# Patient Record
Sex: MALE | Race: WHITE | Employment: OTHER | URBAN - METROPOLITAN AREA
[De-identification: names, ages, dates, MRNs, and addresses within clinical notes are randomized per-mention and may not be internally consistent; named-entity substitution may affect disease eponyms.]

---

## 2017-06-08 PROBLEM — N18.2 CHRONIC KIDNEY DISEASE, STAGE II (MILD): Status: ACTIVE | Noted: 2017-06-08

## 2017-06-08 PROBLEM — H26.9 CATARACTS, BOTH EYES: Status: ACTIVE | Noted: 2017-06-08

## 2017-11-10 ENCOUNTER — TELEPHONE (OUTPATIENT)
Dept: FAMILY MEDICINE CLINIC | Age: 67
End: 2017-11-10

## 2017-11-10 NOTE — TELEPHONE ENCOUNTER
Patient has an appointment on 12/15, would like to have his lab work completed before then. Would like to  order on 11/14 when wife comes in for her appointment.   He will be going to [employer].

## 2017-12-17 PROBLEM — F17.200 TOBACCO DEPENDENCE: Status: ACTIVE | Noted: 2017-12-17

## 2017-12-17 PROBLEM — E11.9 DIABETES MELLITUS TYPE 2 IN NONOBESE (HCC): Status: ACTIVE | Noted: 2017-12-17

## 2018-03-28 ENCOUNTER — TELEPHONE (OUTPATIENT)
Dept: FAMILY MEDICINE CLINIC | Age: 68
End: 2018-03-28

## 2018-03-29 DIAGNOSIS — I10 ESSENTIAL HYPERTENSION: Primary | ICD-10-CM

## 2018-03-29 DIAGNOSIS — Z12.5 ENCOUNTER FOR PROSTATE CANCER SCREENING: ICD-10-CM

## 2018-03-29 DIAGNOSIS — E78.5 HYPERLIPIDEMIA, UNSPECIFIED HYPERLIPIDEMIA TYPE: ICD-10-CM

## 2018-03-29 DIAGNOSIS — Z12.5 PROSTATE CANCER SCREENING: ICD-10-CM

## 2018-03-29 DIAGNOSIS — E11.9 DIABETES MELLITUS TYPE 2 IN NONOBESE (HCC): ICD-10-CM

## 2018-05-31 ENCOUNTER — TELEPHONE (OUTPATIENT)
Dept: FAMILY MEDICINE CLINIC | Age: 68
End: 2018-05-31

## 2018-07-25 RX ORDER — GLIPIZIDE 10 MG/1
TABLET ORAL
Qty: 360 TABLET | Refills: 0 | Status: SHIPPED | OUTPATIENT
Start: 2018-07-25 | End: 2018-10-24 | Stop reason: SDUPTHER

## 2018-07-30 ENCOUNTER — OFFICE VISIT (OUTPATIENT)
Dept: FAMILY MEDICINE CLINIC | Age: 68
End: 2018-07-30

## 2018-07-30 VITALS
HEIGHT: 69 IN | WEIGHT: 211 LBS | SYSTOLIC BLOOD PRESSURE: 134 MMHG | HEART RATE: 76 BPM | DIASTOLIC BLOOD PRESSURE: 64 MMHG | BODY MASS INDEX: 31.25 KG/M2 | OXYGEN SATURATION: 92 %

## 2018-07-30 DIAGNOSIS — Z11.59 ENCOUNTER FOR HEPATITIS C SCREENING TEST FOR LOW RISK PATIENT: ICD-10-CM

## 2018-07-30 DIAGNOSIS — I10 ESSENTIAL HYPERTENSION: ICD-10-CM

## 2018-07-30 DIAGNOSIS — F17.200 TOBACCO DEPENDENCE: ICD-10-CM

## 2018-07-30 DIAGNOSIS — E78.5 HYPERLIPIDEMIA, UNSPECIFIED HYPERLIPIDEMIA TYPE: ICD-10-CM

## 2018-07-30 DIAGNOSIS — E11.9 DIABETES MELLITUS TYPE 2 IN NONOBESE (HCC): Primary | ICD-10-CM

## 2018-07-30 DIAGNOSIS — Z23 NEED FOR PROPHYLACTIC VACCINATION WITH STREPTOCOCCUS PNEUMONIAE (PNEUMOCOCCUS) AND INFLUENZA VACCINES: ICD-10-CM

## 2018-07-30 DIAGNOSIS — N18.2 CHRONIC KIDNEY DISEASE, STAGE II (MILD): ICD-10-CM

## 2018-07-30 DIAGNOSIS — J44.9 CHRONIC OBSTRUCTIVE PULMONARY DISEASE, UNSPECIFIED COPD TYPE (HCC): ICD-10-CM

## 2018-07-30 DIAGNOSIS — N18.30 STAGE 3 CHRONIC KIDNEY DISEASE (HCC): ICD-10-CM

## 2018-07-30 PROCEDURE — 99214 OFFICE O/P EST MOD 30 MIN: CPT | Performed by: FAMILY MEDICINE

## 2018-07-30 PROCEDURE — 90670 PCV13 VACCINE IM: CPT | Performed by: FAMILY MEDICINE

## 2018-07-30 PROCEDURE — G0009 ADMIN PNEUMOCOCCAL VACCINE: HCPCS | Performed by: FAMILY MEDICINE

## 2018-07-30 RX ORDER — ALBUTEROL SULFATE 90 UG/1
2 AEROSOL, METERED RESPIRATORY (INHALATION) EVERY 6 HOURS PRN
Qty: 1 INHALER | Refills: 3 | Status: SHIPPED | OUTPATIENT
Start: 2018-07-30 | End: 2019-05-31 | Stop reason: SDUPTHER

## 2018-07-30 ASSESSMENT — PATIENT HEALTH QUESTIONNAIRE - PHQ9
1. LITTLE INTEREST OR PLEASURE IN DOING THINGS: 1
SUM OF ALL RESPONSES TO PHQ QUESTIONS 1-9: 1
SUM OF ALL RESPONSES TO PHQ9 QUESTIONS 1 & 2: 1
2. FEELING DOWN, DEPRESSED OR HOPELESS: 0

## 2018-07-30 NOTE — PROGRESS NOTES
Establish care with new physician in office      Shea Gottron  YOB: 1950    Date of Service:  7/30/2018    Chief Complaint:   Shea Gottron is a 75 y.o. male who presents for complete physical examination. HPI:     Patient presents for routine follow-up. He had previously followed with Dr. Jessica Villafuerte who has retired. Patient's past medical history of type 2 diabetes mellitus. Mid day blood sugars have been running . Not sure fbs. Watches diet fairly carefully. Occasional cookie (3 per week). Patient is a history of hx pertension. He is compliant with hydrochlorothiazide combination. Patient denies headache, chest pain, and palpitations. Patient with a history of hyperlipidemia. He has been compliant with Zocor. He has not experiencing myalgias. Patient with a history of chronic kidney disease. Recent blood work 7/21/2018 shows stable function. Smokes 1 ppd x 54 years. Uses albuterol inhaler about 4 times a year. Denies cough and shortness of breath otherwise. Stopped drinking diet pop after kidney stones. Now drinks water or lemonade. Last blood work performed 7/21/2018 and reviewed with patient today. LDL was 59 with an HDL of 40. Triglycerides 70. BMP with creatinine of 1.47 in when necessary of 33. GFR 48. This is about his baseline. LFTs normal. A urine microalbumin negative. TSH normal 1.81. PSA normal 1.8 (stable). Hemoglobin A1c was 7.0 which was stable over the preceding 8 months.     Wt Readings from Last 3 Encounters:   07/30/18 211 lb (95.7 kg)   12/15/17 215 lb (97.5 kg)   06/08/17 216 lb (98 kg)     BP Readings from Last 3 Encounters:   07/30/18 134/64   12/15/17 (!) 140/90   06/08/17 136/74       Patient Active Problem List   Diagnosis    Hypertension    Hyperlipemia    Personal history of renal calculi    Chronic kidney disease, stage II (mild)    Cataracts, both eyes    Diabetes mellitus type 2 in nonobese ([address]    Tobacco dependence Preventive Care:  Health Maintenance   Topic Date Due    Hepatitis C screen  1950    Diabetic foot exam  08/01/1959    DTaP/Tdap/Td vaccine (1 - Tdap) 08/01/1968    Shingles Vaccine (1 of 2 - 2 Dose Series) 08/01/1999    Low dose CT lung screening  08/01/2004    Pneumococcal low/med risk (1 of 2 - PCV13) 08/01/2014    Flu vaccine (1) 09/01/2018    Diabetic retinal exam  06/01/2019    A1C test (Diabetic or Prediabetic)  07/21/2019    Lipid screen  07/21/2019    Potassium monitoring  07/21/2019    Creatinine monitoring  07/21/2019    Colon cancer screen colonoscopy  08/20/2025    AAA screen  Completed        Lipid panel:    Lab Results   Component Value Date    CHOL 114 07/21/2018    CHOL 74 07/21/2018    TRIG 70 07/21/2018    HDL 40 (L) 07/21/2018    LDLCALC 59 07/21/2018         There is no immunization history on file for this patient. No Known Allergies  Outpatient Prescriptions Marked as Taking for the 7/30/18 encounter (Office Visit) with Renaldo Urbina MD   Medication Sig Dispense Refill    glipiZIDE (GLUCOTROL) 10 MG tablet TAKE TWO TABLETS BY MOUTH TWICE A DAY BEFORE MEALS 360 tablet 0    LEVEMIR FLEXTOUCH 100 UNIT/ML injection pen INJECT 15 UNITS UNDER THE SKIN ONCE NIGHTLY 5 pen 2    lisinopril-hydrochlorothiazide (PRINZIDE;ZESTORETIC) 20-25 MG per tablet TAKE ONE TABLET BY MOUTH DAILY 90 tablet 1    metFORMIN (GLUCOPHAGE) 1000 MG tablet TAKE ONE TABLET BY MOUTH TWICE A DAY WITH MEALS 60 tablet 4    simvastatin (ZOCOR) 40 MG tablet TAKE ONE TABLET BY MOUTH ONCE NIGHTLY 90 tablet 1    metoprolol tartrate (LOPRESSOR) 25 MG tablet TAKE ONE TABLET BY MOUTH TWICE A DAY 60 tablet 3    glucose blood VI test strips (ASCENSIA AUTODISC VI;ONE TOUCH ULTRA TEST VI) strip 1 each by In Vitro route daily As needed. 100 each 3    glucose blood VI test strips (ACCU-CHEK LACEY PLUS) strip 1 each by In Vitro route daily As needed.  100 each 3       Past Medical History: PREVNAR 13 IM (Pneumococcal conjugate vaccine 13-valent)  -     albuterol sulfate HFA (PROAIR HFA) 108 (90 Base) MCG/ACT inhaler; Inhale 2 puffs into the lungs every 6 hours as needed for Wheezing  -     zoster recombinant adjuvanted vaccine (SHINGRIX) 50 MCG SUSR injection; 0.5mL IM x 1. Followed by 0.5mL IM 2-6 months after dose #1. Repeat colonoscopy planned for 8/2019. Prevnar today. Pneumovax 1 year. Patient considering CT chest lung cancer screening. Screening AAA declined. Tobacco cessation encouraged.

## 2018-07-30 NOTE — PROGRESS NOTES
Patient is here for a follow-up with a concern about sinus. Patient states that he get drainage to his chest.  Patient is current taking Proventil that provides moderate relief.

## 2018-08-03 ENCOUNTER — TELEPHONE (OUTPATIENT)
Dept: RHEUMATOLOGY | Age: 68
End: 2018-08-03

## 2018-08-07 DIAGNOSIS — E11.9 DIABETES MELLITUS TYPE 2 IN NONOBESE (HCC): Primary | ICD-10-CM

## 2018-08-07 RX ORDER — BLOOD-GLUCOSE METER
1 EACH MISCELLANEOUS DAILY
Qty: 1 KIT | Refills: 0 | Status: SHIPPED | OUTPATIENT
Start: 2018-08-07

## 2018-10-24 RX ORDER — GLIPIZIDE 10 MG/1
TABLET ORAL
Qty: 360 TABLET | Refills: 0 | Status: SHIPPED | OUTPATIENT
Start: 2018-10-24 | End: 2019-01-21 | Stop reason: SDUPTHER

## 2018-12-27 RX ORDER — SIMVASTATIN 40 MG
TABLET ORAL
Qty: 30 TABLET | Refills: 0 | Status: SHIPPED | OUTPATIENT
Start: 2018-12-27 | End: 2019-03-20 | Stop reason: SDUPTHER

## 2019-01-21 RX ORDER — GLIPIZIDE 10 MG/1
TABLET ORAL
Qty: 360 TABLET | Refills: 0 | Status: SHIPPED | OUTPATIENT
Start: 2019-01-21 | End: 2019-04-25 | Stop reason: SDUPTHER

## 2019-01-31 ENCOUNTER — OFFICE VISIT (OUTPATIENT)
Dept: FAMILY MEDICINE CLINIC | Age: 69
End: 2019-01-31
Payer: MEDICARE

## 2019-01-31 VITALS
WEIGHT: 212.6 LBS | BODY MASS INDEX: 31.4 KG/M2 | SYSTOLIC BLOOD PRESSURE: 132 MMHG | HEART RATE: 81 BPM | DIASTOLIC BLOOD PRESSURE: 72 MMHG | OXYGEN SATURATION: 91 %

## 2019-01-31 DIAGNOSIS — F17.200 TOBACCO DEPENDENCE: ICD-10-CM

## 2019-01-31 DIAGNOSIS — I10 ESSENTIAL HYPERTENSION: ICD-10-CM

## 2019-01-31 DIAGNOSIS — E87.5 HYPERKALEMIA: ICD-10-CM

## 2019-01-31 DIAGNOSIS — Z23 NEED FOR VACCINATION: ICD-10-CM

## 2019-01-31 DIAGNOSIS — E78.5 HYPERLIPIDEMIA, UNSPECIFIED HYPERLIPIDEMIA TYPE: ICD-10-CM

## 2019-01-31 DIAGNOSIS — N18.30 STAGE 3 CHRONIC KIDNEY DISEASE (HCC): ICD-10-CM

## 2019-01-31 DIAGNOSIS — E11.9 DIABETES MELLITUS TYPE 2 IN NONOBESE (HCC): Primary | ICD-10-CM

## 2019-01-31 PROCEDURE — 99214 OFFICE O/P EST MOD 30 MIN: CPT | Performed by: FAMILY MEDICINE

## 2019-03-21 RX ORDER — SIMVASTATIN 40 MG
TABLET ORAL
Qty: 90 TABLET | Refills: 1 | Status: SHIPPED | OUTPATIENT
Start: 2019-03-21 | End: 2019-10-31 | Stop reason: SDUPTHER

## 2019-04-25 RX ORDER — GLIPIZIDE 10 MG/1
TABLET ORAL
Qty: 360 TABLET | Refills: 0 | Status: SHIPPED | OUTPATIENT
Start: 2019-04-25 | End: 2019-08-07 | Stop reason: SDUPTHER

## 2019-07-16 ENCOUNTER — OFFICE VISIT (OUTPATIENT)
Dept: FAMILY MEDICINE CLINIC | Age: 69
End: 2019-07-16
Payer: MEDICARE

## 2019-07-16 VITALS
DIASTOLIC BLOOD PRESSURE: 74 MMHG | WEIGHT: 215 LBS | BODY MASS INDEX: 31.75 KG/M2 | HEART RATE: 67 BPM | SYSTOLIC BLOOD PRESSURE: 129 MMHG

## 2019-07-16 DIAGNOSIS — Z87.891 PERSONAL HISTORY OF TOBACCO USE: ICD-10-CM

## 2019-07-16 DIAGNOSIS — I10 ESSENTIAL HYPERTENSION: Primary | ICD-10-CM

## 2019-07-16 DIAGNOSIS — E11.9 DIABETES MELLITUS TYPE 2 IN NONOBESE (HCC): ICD-10-CM

## 2019-07-16 DIAGNOSIS — J30.9 ALLERGIC RHINITIS, UNSPECIFIED SEASONALITY, UNSPECIFIED TRIGGER: ICD-10-CM

## 2019-07-16 DIAGNOSIS — F17.200 TOBACCO DEPENDENCE: ICD-10-CM

## 2019-07-16 DIAGNOSIS — N18.30 STAGE 3 CHRONIC KIDNEY DISEASE (HCC): ICD-10-CM

## 2019-07-16 DIAGNOSIS — E78.5 HYPERLIPIDEMIA, UNSPECIFIED HYPERLIPIDEMIA TYPE: ICD-10-CM

## 2019-07-16 DIAGNOSIS — Z87.891 PERSONAL HISTORY OF NICOTINE DEPENDENCE: ICD-10-CM

## 2019-07-16 LAB — HBA1C MFR BLD: 7.5 %

## 2019-07-16 PROCEDURE — G0296 VISIT TO DETERM LDCT ELIG: HCPCS | Performed by: FAMILY MEDICINE

## 2019-07-16 PROCEDURE — 99214 OFFICE O/P EST MOD 30 MIN: CPT | Performed by: FAMILY MEDICINE

## 2019-07-16 PROCEDURE — 83036 HEMOGLOBIN GLYCOSYLATED A1C: CPT | Performed by: FAMILY MEDICINE

## 2019-07-16 RX ORDER — MONTELUKAST SODIUM 10 MG/1
10 TABLET ORAL DAILY
Qty: 30 TABLET | Refills: 11 | Status: SHIPPED | OUTPATIENT
Start: 2019-07-16

## 2019-07-16 RX ORDER — LISINOPRIL AND HYDROCHLOROTHIAZIDE 25; 20 MG/1; MG/1
TABLET ORAL
Qty: 90 TABLET | Refills: 3 | Status: SHIPPED | OUTPATIENT
Start: 2019-07-16

## 2019-07-16 NOTE — PROGRESS NOTES
lisinopril-hydrochlorothiazide (PRINZIDE;ZESTORETIC) 20-25 MG per tablet   2. Diabetes mellitus type 2 in nonobese (HCC)  metFORMIN (GLUCOPHAGE) 1000 MG tablet    POCT glycosylated hemoglobin (Hb A1C)    HEMOGLOBIN A1C   3. Hyperlipidemia, unspecified hyperlipidemia type     4. Tobacco dependence   SCREENING FOR AAA    CT Lung Screen (Initial or Annual)   5. Allergic rhinitis, unspecified seasonality, unspecified trigger  montelukast (SINGULAIR) 10 MG tablet   6. Personal history of nicotine dependence   CT Lung Screen (Initial or Annual)   7. Personal history of tobacco use  MA VISIT TO DISCUSS LUNG CA SCREEN W LDCT    CT Lung Screen (Annual)   8. Stage 3 chronic kidney disease ([facility] [address]  BASIC METABOLIC PANEL       Plan:     Type 2 diabetes mellitus is poorly controlled based on hemoglobin A1c in office of 7.5. Prefers to work on diet over starting prandial insulin. Not interested in quitting tobacco.  Blood pressure well controlled. Continue lisinopril-hydrochlorothiazide combination. Cholesterol well controlled. Continue Zocor at current dose. Due for screening for AAA which was ordered today. Due for CT lung screening which was ordered today. Prescription written for Singulair for seasonal allergies. Low Dose CT (LDCT) Lung Screening criteria met   Age 50-69   Pack year smoking >30   Still smoking or less than 15 year since quit   No sign or symptoms of lung cancer   > 11 months since last LDCT     Risks and benefits of lung cancer screening with LDCT scans discussed:    Significance of positive screen - False-positive LDCT results often occur. 95% of all positive results do not lead to a diagnosis of cancer. Usually further imaging can resolve most false-positive results; however, some patients may require invasive procedures.     Over diagnosis risk - 10% to 12% of screen-detected lung cancer cases are over diagnosed--that is, the cancer would not have been detected in the patient's lifetime

## 2019-08-08 RX ORDER — GLIPIZIDE 10 MG/1
TABLET ORAL
Qty: 360 TABLET | Refills: 3 | Status: SHIPPED | OUTPATIENT
Start: 2019-08-08

## 2019-10-08 RX ORDER — ALBUTEROL SULFATE 90 UG/1
AEROSOL, METERED RESPIRATORY (INHALATION)
Qty: 1 INHALER | Refills: 2 | Status: SHIPPED | OUTPATIENT
Start: 2019-10-08

## 2019-10-29 ENCOUNTER — TELEPHONE (OUTPATIENT)
Dept: PHARMACY | Facility: CLINIC | Age: 69
End: 2019-10-29

## 2019-10-31 RX ORDER — SIMVASTATIN 40 MG
20 TABLET ORAL NIGHTLY
Qty: 45 TABLET | Refills: 0 | Status: SHIPPED | OUTPATIENT
Start: 2019-10-31 | End: 2020-04-30

## 2019-10-31 RX ORDER — SIMVASTATIN 40 MG
TABLET ORAL
Qty: 90 TABLET | Refills: 0 | Status: SHIPPED | OUTPATIENT
Start: 2019-10-31 | End: 2019-10-31 | Stop reason: SDUPTHER

## 2020-01-16 RX ORDER — ALBUTEROL SULFATE 90 UG/1
AEROSOL, METERED RESPIRATORY (INHALATION)
Qty: 1 INHALER | Refills: 1 | OUTPATIENT
Start: 2020-01-16

## 2020-04-30 RX ORDER — SIMVASTATIN 40 MG
TABLET ORAL
Qty: 45 TABLET | Refills: 0 | Status: SHIPPED | OUTPATIENT
Start: 2020-04-30

## 2020-05-15 ENCOUNTER — HOSPITAL ENCOUNTER (OUTPATIENT)
Age: 70
Discharge: HOME | End: 2020-05-15
Payer: MEDICARE

## 2020-05-15 ENCOUNTER — HOSPITAL ENCOUNTER (OUTPATIENT)
Dept: GENERAL RADIOLOGY | Age: 70
Discharge: HOME | End: 2020-05-15
Payer: MEDICARE

## 2020-05-15 PROCEDURE — 71046 X-RAY EXAM CHEST 2 VIEWS: CPT

## 2020-06-05 ENCOUNTER — HOSPITAL ENCOUNTER (OUTPATIENT)
Dept: NON INVASIVE DIAGNOSTICS | Age: 70
Discharge: HOME | End: 2020-06-05
Payer: MEDICARE

## 2020-06-05 LAB
LV EF: 60 %
LVEF MODALITY: NORMAL

## 2020-06-05 PROCEDURE — 93306 TTE W/DOPPLER COMPLETE: CPT

## 2020-07-13 ENCOUNTER — TELEPHONE (OUTPATIENT)
Dept: CARDIOLOGY CLINIC | Age: 70
End: 2020-07-13

## 2020-07-13 NOTE — TELEPHONE ENCOUNTER
Pt called on answering service returning my call call about a new pt appt per Referral from Dr Minnie Jean pt stated he does want to make an appt at this time.  Thank you

## 2020-07-28 RX ORDER — SIMVASTATIN 40 MG
TABLET ORAL
Qty: 45 TABLET | Refills: 0 | OUTPATIENT
Start: 2020-07-28

## 2020-11-10 ENCOUNTER — HOSPITAL ENCOUNTER (OUTPATIENT)
Dept: NON INVASIVE DIAGNOSTICS | Age: 70
Discharge: HOME | End: 2020-11-10
Payer: MEDICARE

## 2020-11-10 LAB
LV EF: 55 %
LVEF MODALITY: NORMAL

## 2020-11-10 PROCEDURE — 6360000004 HC RX CONTRAST MEDICATION: Performed by: FAMILY MEDICINE

## 2020-11-10 PROCEDURE — C8929 TTE W OR WO FOL WCON,DOPPLER: HCPCS

## 2020-11-10 RX ADMIN — PERFLUTREN 1.65 MG: 6.52 INJECTION, SUSPENSION INTRAVENOUS at 11:45

## 2020-11-10 NOTE — PROGRESS NOTES
Definity explained to pt for imaging use during echocardiogram.  Pt verbalized understanding and agreed to its use for diagnostic imaging.